# Patient Record
Sex: FEMALE | Race: WHITE | NOT HISPANIC OR LATINO
[De-identification: names, ages, dates, MRNs, and addresses within clinical notes are randomized per-mention and may not be internally consistent; named-entity substitution may affect disease eponyms.]

---

## 2017-07-05 ENCOUNTER — APPOINTMENT (OUTPATIENT)
Dept: CARDIOLOGY | Facility: CLINIC | Age: 52
End: 2017-07-05

## 2017-07-10 ENCOUNTER — LABORATORY RESULT (OUTPATIENT)
Age: 52
End: 2017-07-10

## 2017-07-10 ENCOUNTER — APPOINTMENT (OUTPATIENT)
Dept: GYNECOLOGIC ONCOLOGY | Facility: CLINIC | Age: 52
End: 2017-07-10
Payer: COMMERCIAL

## 2017-07-10 DIAGNOSIS — M54.5 LOW BACK PAIN: ICD-10-CM

## 2017-07-10 PROCEDURE — 99214 OFFICE O/P EST MOD 30 MIN: CPT

## 2017-07-10 PROCEDURE — 36415 COLL VENOUS BLD VENIPUNCTURE: CPT

## 2017-07-13 LAB
25(OH)D3 SERPL-MCNC: 23.2 NG/ML
BASOPHILS # BLD AUTO: 0.01 K/UL
BASOPHILS NFR BLD AUTO: 0.2 %
CARDIOLIPIN AB SER IA-ACNC: NEGATIVE
CRP SERPL-MCNC: 0.6 MG/DL
EOSINOPHIL # BLD AUTO: 0.04 K/UL
EOSINOPHIL NFR BLD AUTO: 0.7 %
ERYTHROCYTE [SEDIMENTATION RATE] IN BLOOD BY WESTERGREN METHOD: 28 MM/HR
HCT VFR BLD CALC: 40.5 %
HGB BLD-MCNC: 13.4 G/DL
IMM GRANULOCYTES NFR BLD AUTO: 0.2 %
LYMPHOCYTES # BLD AUTO: 1.98 K/UL
LYMPHOCYTES NFR BLD AUTO: 36.1 %
MAN DIFF?: NORMAL
MCHC RBC-ENTMCNC: 30.2 PG
MCHC RBC-ENTMCNC: 33.1 GM/DL
MCV RBC AUTO: 91.2 FL
MONOCYTES # BLD AUTO: 0.3 K/UL
MONOCYTES NFR BLD AUTO: 5.5 %
NEUTROPHILS # BLD AUTO: 3.15 K/UL
NEUTROPHILS NFR BLD AUTO: 57.3 %
PLATELET # BLD AUTO: 322 K/UL
RBC # BLD: 4.44 M/UL
RBC # FLD: 15.5 %
TSH SERPL-ACNC: 1.81 UIU/ML
WBC # FLD AUTO: 5.49 K/UL

## 2017-07-18 ENCOUNTER — APPOINTMENT (OUTPATIENT)
Dept: CARDIOLOGY | Facility: CLINIC | Age: 52
End: 2017-07-18

## 2017-07-19 ENCOUNTER — APPOINTMENT (OUTPATIENT)
Dept: CARDIOLOGY | Facility: CLINIC | Age: 52
End: 2017-07-19

## 2017-07-19 ENCOUNTER — CLINICAL ADVICE (OUTPATIENT)
Age: 52
End: 2017-07-19

## 2017-07-31 ENCOUNTER — TRANSCRIPTION ENCOUNTER (OUTPATIENT)
Age: 52
End: 2017-07-31

## 2017-08-09 ENCOUNTER — APPOINTMENT (OUTPATIENT)
Dept: CARDIOLOGY | Facility: CLINIC | Age: 52
End: 2017-08-09
Payer: COMMERCIAL

## 2017-08-09 PROCEDURE — 99213 OFFICE O/P EST LOW 20 MIN: CPT

## 2018-12-03 ENCOUNTER — APPOINTMENT (OUTPATIENT)
Dept: GYNECOLOGIC ONCOLOGY | Facility: CLINIC | Age: 53
End: 2018-12-03

## 2019-03-25 NOTE — PAST MEDICAL HISTORY
[Surgical Menopause] : The patient is in surgical menopause [Menarche Age ____] : age at menarche was [unfilled] [Definite ___ (Date)] : the last menstrual period was [unfilled]

## 2019-04-01 ENCOUNTER — TRANSCRIPTION ENCOUNTER (OUTPATIENT)
Age: 54
End: 2019-04-01

## 2019-04-01 ENCOUNTER — APPOINTMENT (OUTPATIENT)
Dept: GYNECOLOGIC ONCOLOGY | Facility: CLINIC | Age: 54
End: 2019-04-01
Payer: COMMERCIAL

## 2019-04-01 VITALS
SYSTOLIC BLOOD PRESSURE: 179 MMHG | BODY MASS INDEX: 35.74 KG/M2 | HEIGHT: 65 IN | WEIGHT: 214.5 LBS | DIASTOLIC BLOOD PRESSURE: 97 MMHG | HEART RATE: 62 BPM | OXYGEN SATURATION: 95 %

## 2019-04-01 PROCEDURE — 36415 COLL VENOUS BLD VENIPUNCTURE: CPT

## 2019-04-01 PROCEDURE — 99214 OFFICE O/P EST MOD 30 MIN: CPT

## 2019-04-01 NOTE — HISTORY OF PRESENT ILLNESS
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 Negative peritoneal biopsies\par    b) LVSI + in endometrial tumor\par 3) Placement of IP port 10/07\par 4) Ip Taxol 135mg/m2 + Cisplatin IP 75mg/m2 + IV Taxol 60mg/m2 x 6 initiated on 10/29/07\par 5) Vaginal HDR completed April 2008 (2,000 cGy)\par 6) Removal of IP port 3/6/08\par      a)negative washings\par 7) Incisional hernia repair, 4/09\par     a)negative hernia sac for malignancy\par     b) incarcerated bowel re-operation\par 8) Multiple abdominal hernia repairs, 4/12\par \par Here for annual follow up. Doing well.  No complaints. Has not been here in 2 years\par \par Health Maintenance\par mammogram 2018 was normal as per patient.\par Bone Density 6/2017 Osteopenia

## 2019-04-01 NOTE — DISCUSSION/SUMMARY
[FreeTextEntry1] : Clinically [facility]\par \par -Blood work drawn today\par -Mammogram Rx given\par -CT scan at  patient request\par -colonoscopy needed for screening\par -repeat genetic testing because she had it over 10 years ago and now has done [email or web address] \par -will meet with PMD as her BP has been very high for a while and she has not seen one\par -I recommend she takes her bp daily at her home\par -exercise and weight loss has been advised\par -annual followup

## 2019-04-02 LAB
ALBUMIN SERPL ELPH-MCNC: 4.7 G/DL
ALP BLD-CCNC: 95 U/L
ALT SERPL-CCNC: 16 U/L
ANION GAP SERPL CALC-SCNC: 12 MMOL/L
AST SERPL-CCNC: 21 U/L
BASOPHILS # BLD AUTO: 0.02 K/UL
BASOPHILS NFR BLD AUTO: 0.3 %
BILIRUB SERPL-MCNC: 0.5 MG/DL
BUN SERPL-MCNC: 16 MG/DL
CALCIUM SERPL-MCNC: 9.9 MG/DL
CANCER AG125 SERPL-ACNC: 18 U/ML
CHLORIDE SERPL-SCNC: 106 MMOL/L
CHOLEST SERPL-MCNC: 183 MG/DL
CHOLEST/HDLC SERPL: 2.8 RATIO
CO2 SERPL-SCNC: 24 MMOL/L
CREAT SERPL-MCNC: 0.88 MG/DL
EOSINOPHIL # BLD AUTO: 0.06 K/UL
EOSINOPHIL NFR BLD AUTO: 1 %
ESTIMATED AVERAGE GLUCOSE: 108 MG/DL
HBA1C MFR BLD HPLC: 5.4 %
HCT VFR BLD CALC: 44 %
HDLC SERPL-MCNC: 66 MG/DL
HGB BLD-MCNC: 14.5 G/DL
IMM GRANULOCYTES NFR BLD AUTO: 0.2 %
LDLC SERPL CALC-MCNC: 106 MG/DL
LYMPHOCYTES # BLD AUTO: 2.05 K/UL
LYMPHOCYTES NFR BLD AUTO: 33.2 %
MAN DIFF?: NORMAL
MCHC RBC-ENTMCNC: 30.7 PG
MCHC RBC-ENTMCNC: 33 GM/DL
MCV RBC AUTO: 93.2 FL
MONOCYTES # BLD AUTO: 0.47 K/UL
MONOCYTES NFR BLD AUTO: 7.6 %
NEUTROPHILS # BLD AUTO: 3.56 K/UL
NEUTROPHILS NFR BLD AUTO: 57.7 %
PLATELET # BLD AUTO: 317 K/UL
POTASSIUM SERPL-SCNC: 3.9 MMOL/L
PROT SERPL-MCNC: 7.2 G/DL
RBC # BLD: 4.72 M/UL
RBC # FLD: 12.9 %
SODIUM SERPL-SCNC: 142 MMOL/L
TRIGL SERPL-MCNC: 54 MG/DL
WBC # FLD AUTO: 6.17 K/UL

## 2019-11-07 ENCOUNTER — RX RENEWAL (OUTPATIENT)
Age: 54
End: 2019-11-07

## 2020-03-13 ENCOUNTER — RX RENEWAL (OUTPATIENT)
Age: 55
End: 2020-03-13

## 2021-04-08 ENCOUNTER — NON-APPOINTMENT (OUTPATIENT)
Age: 56
End: 2021-04-08

## 2021-04-15 ENCOUNTER — APPOINTMENT (OUTPATIENT)
Dept: CARDIOLOGY | Facility: CLINIC | Age: 56
End: 2021-04-15
Payer: COMMERCIAL

## 2021-04-15 ENCOUNTER — NON-APPOINTMENT (OUTPATIENT)
Age: 56
End: 2021-04-15

## 2021-04-15 VITALS
HEART RATE: 78 BPM | WEIGHT: 226 LBS | BODY MASS INDEX: 37.65 KG/M2 | DIASTOLIC BLOOD PRESSURE: 80 MMHG | SYSTOLIC BLOOD PRESSURE: 122 MMHG | TEMPERATURE: 97.3 F | HEIGHT: 65 IN | OXYGEN SATURATION: 98 %

## 2021-04-15 PROCEDURE — 99215 OFFICE O/P EST HI 40 MIN: CPT

## 2021-04-15 PROCEDURE — 99072 ADDL SUPL MATRL&STAF TM PHE: CPT

## 2021-04-15 PROCEDURE — 93000 ELECTROCARDIOGRAM COMPLETE: CPT

## 2021-04-15 NOTE — DISCUSSION/SUMMARY
[FreeTextEntry1] : She will repeat the echo and then see me again afterwards and I will try and refine her blood pressure medication.

## 2021-04-15 NOTE — REASON FOR VISIT
[Follow-Up - From Hospitalization] : follow-up of a recent hospitalization for [Hypertension] : hypertension [FreeTextEntry1] : I saw this 55-year-old woman in follow-up consultation on  04/15/21\par She is 12 years free of ovarian cancer after chemotherapy and radiation she was seen by me 4 years ago and now comes to see me because she was recently in the hospital with Covid and finds that she now has hypertension requiring medication.\par She is otherwise asymptomatic\par She saw me 4 years ago because a CT scan showed aortic atherosclerosis.  She had carotid Dopplers which were benign and a cardiac echo which was benign\par On her current regime of hydralazine 25 3 times daily and amlodipine 10 mg daily should her blood pressure is well controlled.

## 2021-04-15 NOTE — PHYSICAL EXAM
[General Appearance - Well Developed] : well developed [Normal Appearance] : normal appearance [Well Groomed] : well groomed [General Appearance - Well Nourished] : well nourished [No Deformities] : no deformities [General Appearance - In No Acute Distress] : no acute distress [Normal Conjunctiva] : the conjunctiva exhibited no abnormalities [Eyelids - No Xanthelasma] : the eyelids demonstrated no xanthelasmas [Normal Oral Mucosa] : normal oral mucosa [No Oral Pallor] : no oral pallor [No Oral Cyanosis] : no oral cyanosis [Normal Jugular Venous A Waves Present] : normal jugular venous A waves present [Normal Jugular Venous V Waves Present] : normal jugular venous V waves present [No Jugular Venous Gonzalez A Waves] : no jugular venous gonzalez A waves [Respiration, Rhythm And Depth] : normal respiratory rhythm and effort [Exaggerated Use Of Accessory Muscles For Inspiration] : no accessory muscle use [Auscultation Breath Sounds / Voice Sounds] : lungs were clear to auscultation bilaterally [Heart Rate And Rhythm] : heart rate and rhythm were normal [Heart Sounds] : normal S1 and S2 [Murmurs] : no murmurs present [Abdomen Soft] : soft [Abdomen Tenderness] : non-tender [Abdomen Mass (___ Cm)] : no abdominal mass palpated [Abnormal Walk] : normal gait [Gait - Sufficient For Exercise Testing] : the gait was sufficient for exercise testing [Nail Clubbing] : no clubbing of the fingernails [Cyanosis, Localized] : no localized cyanosis [Petechial Hemorrhages (___cm)] : no petechial hemorrhages [Skin Color & Pigmentation] : normal skin color and pigmentation [] : no rash [No Venous Stasis] : no venous stasis [Skin Lesions] : no skin lesions [No Skin Ulcers] : no skin ulcer [No Xanthoma] : no  xanthoma was observed [Oriented To Time, Place, And Person] : oriented to person, place, and time [Affect] : the affect was normal [No Anxiety] : not feeling anxious [Mood] : the mood was normal

## 2021-05-01 ENCOUNTER — NON-APPOINTMENT (OUTPATIENT)
Age: 56
End: 2021-05-01

## 2021-05-03 ENCOUNTER — APPOINTMENT (OUTPATIENT)
Dept: CARDIOLOGY | Facility: CLINIC | Age: 56
End: 2021-05-03
Payer: COMMERCIAL

## 2021-05-03 PROCEDURE — 93306 TTE W/DOPPLER COMPLETE: CPT

## 2021-05-03 PROCEDURE — 99072 ADDL SUPL MATRL&STAF TM PHE: CPT

## 2021-05-20 ENCOUNTER — APPOINTMENT (OUTPATIENT)
Dept: CARDIOLOGY | Facility: CLINIC | Age: 56
End: 2021-05-20
Payer: COMMERCIAL

## 2021-05-20 VITALS
WEIGHT: 222 LBS | HEART RATE: 76 BPM | OXYGEN SATURATION: 98 % | HEIGHT: 65 IN | BODY MASS INDEX: 36.99 KG/M2 | SYSTOLIC BLOOD PRESSURE: 124 MMHG | DIASTOLIC BLOOD PRESSURE: 82 MMHG | TEMPERATURE: 97.7 F

## 2021-05-20 PROCEDURE — 99215 OFFICE O/P EST HI 40 MIN: CPT

## 2021-05-20 RX ORDER — AMLODIPINE BESYLATE 5 MG/1
5 TABLET ORAL DAILY
Qty: 90 | Refills: 1 | Status: DISCONTINUED | COMMUNITY
Start: 1900-01-01 | End: 2021-05-20

## 2021-05-20 NOTE — REASON FOR VISIT
[Follow-Up - From Hospitalization] : follow-up of a recent hospitalization for [Hypertension] : hypertension [FreeTextEntry1] : I saw this 55-year-old woman in follow-up consultation on  04/15/21\par She is 12 years free of ovarian cancer after chemotherapy and radiation she was seen by me 4 years ago and now comes to see me because she was recently in the hospital with Covid and finds that she now has hypertension requiring medication.\par She is otherwise asymptomatic\par She saw me 4 years ago because a CT scan showed aortic atherosclerosis.  She had carotid Dopplers which were benign and a cardiac echo which was benign\par On her current regime of hydralazine 25 3 times daily and amlodipine 10 mg daily should her blood pressure is well controlled.\par However because of significant leg swelling the amlodipine was reduced to 5 mg.  But she still has some leg swelling and I will discontinue the amlodipine and start losartan 25 mg daily.

## 2021-05-20 NOTE — PHYSICAL EXAM
[General Appearance - Well Developed] : well developed [Normal Appearance] : normal appearance [Well Groomed] : well groomed [General Appearance - Well Nourished] : well nourished [No Deformities] : no deformities [General Appearance - In No Acute Distress] : no acute distress [Normal Conjunctiva] : the conjunctiva exhibited no abnormalities [Eyelids - No Xanthelasma] : the eyelids demonstrated no xanthelasmas [Normal Oral Mucosa] : normal oral mucosa [No Oral Pallor] : no oral pallor [No Oral Cyanosis] : no oral cyanosis [Normal Jugular Venous A Waves Present] : normal jugular venous A waves present [Normal Jugular Venous V Waves Present] : normal jugular venous V waves present [No Jugular Venous Gonzalez A Waves] : no jugular venous gonzalez A waves [Respiration, Rhythm And Depth] : normal respiratory rhythm and effort [Exaggerated Use Of Accessory Muscles For Inspiration] : no accessory muscle use [Auscultation Breath Sounds / Voice Sounds] : lungs were clear to auscultation bilaterally [Heart Rate And Rhythm] : heart rate and rhythm were normal [Heart Sounds] : normal S1 and S2 [Murmurs] : no murmurs present [Abdomen Soft] : soft [Abdomen Tenderness] : non-tender [Abdomen Mass (___ Cm)] : no abdominal mass palpated [Abnormal Walk] : normal gait [Gait - Sufficient For Exercise Testing] : the gait was sufficient for exercise testing [Nail Clubbing] : no clubbing of the fingernails [Cyanosis, Localized] : no localized cyanosis [Petechial Hemorrhages (___cm)] : no petechial hemorrhages [Skin Color & Pigmentation] : normal skin color and pigmentation [] : no rash [No Venous Stasis] : no venous stasis [Skin Lesions] : no skin lesions [No Skin Ulcers] : no skin ulcer [No Xanthoma] : no  xanthoma was observed [Oriented To Time, Place, And Person] : oriented to person, place, and time [Affect] : the affect was normal [Mood] : the mood was normal [No Anxiety] : not feeling anxious

## 2021-05-20 NOTE — DISCUSSION/SUMMARY
[FreeTextEntry1] : The repeat  echo was normal.\par Blood pressure meds rearranged. D/c Norvasc and started Losartan 25mg\par Return in 6 months

## 2021-07-02 RX ORDER — LOSARTAN POTASSIUM 25 MG/1
25 TABLET, FILM COATED ORAL DAILY
Qty: 90 | Refills: 3 | Status: DISCONTINUED | COMMUNITY
Start: 2021-05-20 | End: 2021-07-02

## 2021-08-09 ENCOUNTER — APPOINTMENT (OUTPATIENT)
Dept: GYNECOLOGIC ONCOLOGY | Facility: CLINIC | Age: 56
End: 2021-08-09

## 2021-08-23 ENCOUNTER — APPOINTMENT (OUTPATIENT)
Dept: GYNECOLOGIC ONCOLOGY | Facility: CLINIC | Age: 56
End: 2021-08-23

## 2021-09-20 ENCOUNTER — APPOINTMENT (OUTPATIENT)
Dept: GYNECOLOGIC ONCOLOGY | Facility: CLINIC | Age: 56
End: 2021-09-20

## 2022-01-28 ENCOUNTER — NON-APPOINTMENT (OUTPATIENT)
Age: 57
End: 2022-01-28

## 2022-02-24 ENCOUNTER — APPOINTMENT (OUTPATIENT)
Dept: CARDIOLOGY | Facility: CLINIC | Age: 57
End: 2022-02-24

## 2022-03-28 ENCOUNTER — APPOINTMENT (OUTPATIENT)
Dept: GYNECOLOGIC ONCOLOGY | Facility: CLINIC | Age: 57
End: 2022-03-28
Payer: COMMERCIAL

## 2022-03-28 VITALS
SYSTOLIC BLOOD PRESSURE: 127 MMHG | RESPIRATION RATE: 18 BRPM | WEIGHT: 227 LBS | OXYGEN SATURATION: 98 % | BODY MASS INDEX: 37.82 KG/M2 | DIASTOLIC BLOOD PRESSURE: 83 MMHG | HEART RATE: 73 BPM | TEMPERATURE: 97.6 F | HEIGHT: 65 IN

## 2022-03-28 PROCEDURE — ZZZZZ: CPT

## 2022-03-29 ENCOUNTER — TRANSCRIPTION ENCOUNTER (OUTPATIENT)
Age: 57
End: 2022-03-29

## 2022-03-29 LAB
CANCER AG125 SERPL-ACNC: 20 U/ML
CHOLEST SERPL-MCNC: 230 MG/DL
ESTIMATED AVERAGE GLUCOSE: 114 MG/DL
HBA1C MFR BLD HPLC: 5.6 %
HDLC SERPL-MCNC: 67 MG/DL
LDLC SERPL CALC-MCNC: 143 MG/DL
NONHDLC SERPL-MCNC: 163 MG/DL
TRIGL SERPL-MCNC: 100 MG/DL
TSH SERPL-ACNC: 1.23 UIU/ML

## 2022-04-05 ENCOUNTER — NON-APPOINTMENT (OUTPATIENT)
Age: 57
End: 2022-04-05

## 2022-04-07 ENCOUNTER — NON-APPOINTMENT (OUTPATIENT)
Age: 57
End: 2022-04-07

## 2022-04-07 ENCOUNTER — APPOINTMENT (OUTPATIENT)
Dept: CARDIOLOGY | Facility: CLINIC | Age: 57
End: 2022-04-07
Payer: COMMERCIAL

## 2022-04-07 VITALS
SYSTOLIC BLOOD PRESSURE: 124 MMHG | WEIGHT: 224 LBS | TEMPERATURE: 97.5 F | DIASTOLIC BLOOD PRESSURE: 82 MMHG | OXYGEN SATURATION: 96 % | BODY MASS INDEX: 37.32 KG/M2 | HEIGHT: 65 IN | HEART RATE: 70 BPM

## 2022-04-07 DIAGNOSIS — U09.9 POST COVID-19 CONDITION, UNSPECIFIED: ICD-10-CM

## 2022-04-07 DIAGNOSIS — C54.1 MALIGNANT NEOPLASM OF ENDOMETRIUM: ICD-10-CM

## 2022-04-07 PROCEDURE — 99215 OFFICE O/P EST HI 40 MIN: CPT

## 2022-04-07 PROCEDURE — 93000 ELECTROCARDIOGRAM COMPLETE: CPT

## 2022-04-07 NOTE — DISCUSSION/SUMMARY
[FreeTextEntry1] : The repeat  echo was normal.\par Blood pressure meds rearranged. BP well controlled\par Return in 6 months\par There is no issues with her taking Ozempic

## 2022-04-07 NOTE — REASON FOR VISIT
[Hyperlipidemia] : hyperlipidemia [Follow-Up - From Hospitalization] : follow-up of a recent hospitalization for [Hypertension] : hypertension [FreeTextEntry3] : Kayla [FreeTextEntry1] : I saw this 56-year-old woman in follow-up consultation on  04/07/22\par She is 12 years free of ovarian cancer after chemotherapy and radiation she was seen by me 5 years ago and now comes to see me because she was recently in the hospital with Covid and finds that she now has hypertension requiring medication.\par She is otherwise asymptomatic\par She saw me 4 years ago because a CT scan showed aortic atherosclerosis.  She had carotid Dopplers which were benign and a cardiac echo which was benign\par On her current regime of hydralazine 25 3 times daily and Lisinopril 10 mg daily should her blood pressure is well controlled.\par

## 2022-05-05 ENCOUNTER — APPOINTMENT (OUTPATIENT)
Dept: UROGYNECOLOGY | Facility: CLINIC | Age: 57
End: 2022-05-05
Payer: COMMERCIAL

## 2022-05-05 ENCOUNTER — RESULT CHARGE (OUTPATIENT)
Age: 57
End: 2022-05-05

## 2022-05-05 DIAGNOSIS — Z82.3 FAMILY HISTORY OF STROKE: ICD-10-CM

## 2022-05-05 DIAGNOSIS — H40.20X0 UNSPECIFIED PRIMARY ANGLE-CLOSURE GLAUCOMA, STAGE UNSPECIFIED: ICD-10-CM

## 2022-05-05 DIAGNOSIS — N39.41 URGE INCONTINENCE: ICD-10-CM

## 2022-05-05 DIAGNOSIS — K59.00 CONSTIPATION, UNSPECIFIED: ICD-10-CM

## 2022-05-05 DIAGNOSIS — Z84.1 FAMILY HISTORY OF DISORDERS OF KIDNEY AND URETER: ICD-10-CM

## 2022-05-05 DIAGNOSIS — R35.1 NOCTURIA: ICD-10-CM

## 2022-05-05 DIAGNOSIS — K46.9 UNSPECIFIED ABDOMINAL HERNIA W/OUT OBSTRUCTION OR GANGRENE: ICD-10-CM

## 2022-05-05 DIAGNOSIS — E66.9 OBESITY, UNSPECIFIED: ICD-10-CM

## 2022-05-05 DIAGNOSIS — R33.9 RETENTION OF URINE, UNSPECIFIED: ICD-10-CM

## 2022-05-05 LAB
BILIRUB UR QL STRIP: NEGATIVE
CLARITY UR: CLEAR
COLLECTION METHOD: NORMAL
GLUCOSE UR-MCNC: NEGATIVE
HCG UR QL: 0.2 EU/DL
HGB UR QL STRIP.AUTO: NEGATIVE
KETONES UR-MCNC: NORMAL
LEUKOCYTE ESTERASE UR QL STRIP: NORMAL
NITRITE UR QL STRIP: NEGATIVE
PH UR STRIP: 7
PROT UR STRIP-MCNC: NEGATIVE
SP GR UR STRIP: 1.02

## 2022-05-05 PROCEDURE — 51701 INSERT BLADDER CATHETER: CPT

## 2022-05-05 PROCEDURE — 99204 OFFICE O/P NEW MOD 45 MIN: CPT | Mod: 25

## 2022-05-05 RX ORDER — SEMAGLUTIDE 1.34 MG/ML
INJECTION, SOLUTION SUBCUTANEOUS
Refills: 0 | Status: ACTIVE | COMMUNITY

## 2022-05-05 NOTE — OB HISTORY
[Vaginal ___] : [unfilled] vaginal delivery(s) [Approximately ___ (Month)] : the LMP was approximately [unfilled] month(s) ago [Last Pap Smear ___] : date of last pap smear was on [unfilled] [Sexually Active] : sexually active [Abnormal Pap Smear] : normal pap smear [Taking Estrogens] : is not taking estrogen replacement [FreeTextEntry1] : Largest baby 8#2oz.

## 2022-05-05 NOTE — DISCUSSION/SUMMARY
[FreeTextEntry1] : Ms. CARPIO is 56 with mixed UI, moderate mid line cystocele, cancer survivor. We discussed her prolapse and the symptoms associated with it. At this point it is largely asymptomatic. If she does desire a repair I would think some vaginal suturing would be adequate.  We talked about the types of urinary incontinence and the treatment options. We reviewed physical therapy, medication, surgery, vaginal inserts and third line treatments. I recommended bladder testing UDTs. I sent her urine to the lab. I gave her some literature on pelvic muscle strengthening and bladder training. She would like to avoid surgery if possibly. I was able to answer all of her questions.\par

## 2022-05-05 NOTE — REASON FOR VISIT
[Pelvic Organ Prolapse] : pelvic organ prolapse [Urinary Urgency] : urinary urgency [Urinary Incontinence] : urinary incontinence

## 2022-05-05 NOTE — HISTORY OF PRESENT ILLNESS
[FreeTextEntry1] : 55 yo  female with complaints of questionable of prolapse and leaking of urine. She has significant urgency and will leak with an urge. She will also leak with sneeze or cough. Not wearing pads. Has urinary frequency but admits to drinking several cups of coffee. Feels she empties but will void and then shorty after will have to void again. Gets up 2-3 times at night. Not wetting bed.  Admits to drinking a lot of water. Told by GYN she had a dropped bladder. She does feels something right at the opening. Mother had a pessary. Has chronic constipation. Had endometrial and ovarian cancer. Had hysterectomy 2007. Had both radiation and chemo. Has narrow angle glaucoma, had surgery and her pressure is normal. Has 2 hernias that were repaired with mesh. Had SBO after cancer surgery.

## 2022-05-09 LAB
APPEARANCE: CLEAR
BACTERIA UR CULT: NORMAL
BACTERIA: NEGATIVE
BILIRUBIN URINE: NEGATIVE
BLOOD URINE: NORMAL
COLOR: YELLOW
GLUCOSE QUALITATIVE U: NEGATIVE
KETONES URINE: NEGATIVE
LEUKOCYTE ESTERASE URINE: NEGATIVE
MICROSCOPIC-UA: NORMAL
NITRITE URINE: NEGATIVE
PH URINE: 6
PROTEIN URINE: NEGATIVE
RED BLOOD CELLS URINE: 1 /HPF
SPECIFIC GRAVITY URINE: 1.02
SQUAMOUS EPITHELIAL CELLS: 0 /HPF
UROBILINOGEN URINE: NORMAL
WHITE BLOOD CELLS URINE: 1 /HPF

## 2022-06-17 ENCOUNTER — APPOINTMENT (OUTPATIENT)
Dept: UROGYNECOLOGY | Facility: CLINIC | Age: 57
End: 2022-06-17

## 2022-06-17 NOTE — HISTORY OF PRESENT ILLNESS
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 Negative peritoneal biopsies\par    b) LVSI + in endometrial tumor\par 3) Placement of IP port 10/07\par 4) Ip Taxol 135mg/m2 + Cisplatin IP 75mg/m2 + IV Taxol 60mg/m2 x 6 initiated on 10/29/07\par 5) Vaginal HDR completed April 2008 (2,000 cGy)\par 6) Removal of IP port 3/6/08\par      a)negative washings\par 7) Incisional hernia repair, 4/09\par     a)negative hernia sac for malignancy\par     b) incarcerated bowel re-operation\par 8) Multiple abdominal hernia repairs, 4/12\par 9) CTAP 4/2019 [facility]\par \par Here for annual follow up. Overdue for surveillance. Stuggling with weight loss.  Back painful at times, no vaginal bleeding.\par \par Health Maintenance\par mammogram 2018 was normal as per patient.\par Bone Density 6/2017 Osteopenia

## 2022-06-17 NOTE — DISCUSSION/SUMMARY
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

## 2022-10-24 ENCOUNTER — RX RENEWAL (OUTPATIENT)
Age: 57
End: 2022-10-24

## 2023-01-16 ENCOUNTER — APPOINTMENT (OUTPATIENT)
Dept: OPHTHALMOLOGY | Facility: CLINIC | Age: 58
End: 2023-01-16
Payer: COMMERCIAL

## 2023-01-16 ENCOUNTER — NON-APPOINTMENT (OUTPATIENT)
Age: 58
End: 2023-01-16

## 2023-01-16 PROCEDURE — 99203 OFFICE O/P NEW LOW 30 MIN: CPT

## 2023-01-19 ENCOUNTER — APPOINTMENT (OUTPATIENT)
Dept: OPHTHALMOLOGY | Facility: CLINIC | Age: 58
End: 2023-01-19
Payer: COMMERCIAL

## 2023-01-19 ENCOUNTER — NON-APPOINTMENT (OUTPATIENT)
Age: 58
End: 2023-01-19

## 2023-01-19 PROCEDURE — 99213 OFFICE O/P EST LOW 20 MIN: CPT

## 2023-01-26 ENCOUNTER — NON-APPOINTMENT (OUTPATIENT)
Age: 58
End: 2023-01-26

## 2023-01-26 ENCOUNTER — APPOINTMENT (OUTPATIENT)
Dept: OPHTHALMOLOGY | Facility: CLINIC | Age: 58
End: 2023-01-26
Payer: COMMERCIAL

## 2023-01-26 PROCEDURE — 99213 OFFICE O/P EST LOW 20 MIN: CPT

## 2023-02-03 ENCOUNTER — APPOINTMENT (OUTPATIENT)
Dept: OPHTHALMOLOGY | Facility: CLINIC | Age: 58
End: 2023-02-03
Payer: COMMERCIAL

## 2023-02-03 ENCOUNTER — NON-APPOINTMENT (OUTPATIENT)
Age: 58
End: 2023-02-03

## 2023-02-03 PROCEDURE — 92014 COMPRE OPH EXAM EST PT 1/>: CPT

## 2023-03-24 ENCOUNTER — RX RENEWAL (OUTPATIENT)
Age: 58
End: 2023-03-24

## 2023-08-07 ENCOUNTER — RX RENEWAL (OUTPATIENT)
Age: 58
End: 2023-08-07

## 2023-08-08 ENCOUNTER — APPOINTMENT (OUTPATIENT)
Dept: CARDIOLOGY | Facility: CLINIC | Age: 58
End: 2023-08-08

## 2023-08-31 ENCOUNTER — RX RENEWAL (OUTPATIENT)
Age: 58
End: 2023-08-31

## 2023-09-11 ENCOUNTER — APPOINTMENT (OUTPATIENT)
Dept: GYNECOLOGIC ONCOLOGY | Facility: CLINIC | Age: 58
End: 2023-09-11

## 2023-09-27 RX ORDER — TRAMADOL HYDROCHLORIDE 50 MG/1
50 TABLET, COATED ORAL
Qty: 30 | Refills: 1 | Status: ACTIVE | COMMUNITY
Start: 2021-12-29 | End: 1900-01-01

## 2023-11-13 ENCOUNTER — APPOINTMENT (OUTPATIENT)
Dept: GYNECOLOGIC ONCOLOGY | Facility: CLINIC | Age: 58
End: 2023-11-13
Payer: COMMERCIAL

## 2023-11-13 ENCOUNTER — NON-APPOINTMENT (OUTPATIENT)
Age: 58
End: 2023-11-13

## 2023-11-13 VITALS
WEIGHT: 160 LBS | HEART RATE: 65 BPM | SYSTOLIC BLOOD PRESSURE: 129 MMHG | HEIGHT: 65 IN | TEMPERATURE: 97.2 F | DIASTOLIC BLOOD PRESSURE: 83 MMHG | BODY MASS INDEX: 26.66 KG/M2 | OXYGEN SATURATION: 100 %

## 2023-11-13 DIAGNOSIS — C56.9 MALIGNANT NEOPLASM OF UNSPECIFIED OVARY: ICD-10-CM

## 2023-11-13 DIAGNOSIS — N60.11 DIFFUSE CYSTIC MASTOPATHY OF LEFT BREAST: ICD-10-CM

## 2023-11-13 DIAGNOSIS — N60.12 DIFFUSE CYSTIC MASTOPATHY OF LEFT BREAST: ICD-10-CM

## 2023-11-13 DIAGNOSIS — Z13.29 ENCOUNTER FOR SCREENING FOR OTHER SUSPECTED ENDOCRINE DISORDER: ICD-10-CM

## 2023-11-13 PROCEDURE — 99213 OFFICE O/P EST LOW 20 MIN: CPT

## 2023-11-14 LAB
ALBUMIN SERPL ELPH-MCNC: 4.6 G/DL
ALP BLD-CCNC: 83 U/L
ALT SERPL-CCNC: 19 U/L
ANION GAP SERPL CALC-SCNC: 15 MMOL/L
AST SERPL-CCNC: 19 U/L
BASOPHILS # BLD AUTO: 0.02 K/UL
BASOPHILS NFR BLD AUTO: 0.3 %
BILIRUB SERPL-MCNC: 0.4 MG/DL
BUN SERPL-MCNC: 16 MG/DL
CALCIUM SERPL-MCNC: 9.5 MG/DL
CANCER AG125 SERPL-ACNC: 17 U/ML
CHLORIDE SERPL-SCNC: 102 MMOL/L
CHOLEST SERPL-MCNC: 172 MG/DL
CO2 SERPL-SCNC: 22 MMOL/L
CREAT SERPL-MCNC: 1.01 MG/DL
EGFR: 64 ML/MIN/1.73M2
EOSINOPHIL # BLD AUTO: 0.05 K/UL
EOSINOPHIL NFR BLD AUTO: 0.8 %
HCT VFR BLD CALC: 41.8 %
HDLC SERPL-MCNC: 73 MG/DL
HGB BLD-MCNC: 13.7 G/DL
IMM GRANULOCYTES NFR BLD AUTO: 0.2 %
LDLC SERPL CALC-MCNC: 89 MG/DL
LYMPHOCYTES # BLD AUTO: 2.24 K/UL
LYMPHOCYTES NFR BLD AUTO: 35.5 %
MAGNESIUM SERPL-MCNC: 1.8 MG/DL
MAN DIFF?: NORMAL
MCHC RBC-ENTMCNC: 31.4 PG
MCHC RBC-ENTMCNC: 32.8 GM/DL
MCV RBC AUTO: 95.9 FL
MONOCYTES # BLD AUTO: 0.45 K/UL
MONOCYTES NFR BLD AUTO: 7.1 %
NEUTROPHILS # BLD AUTO: 3.54 K/UL
NEUTROPHILS NFR BLD AUTO: 56.1 %
NONHDLC SERPL-MCNC: 99 MG/DL
PLATELET # BLD AUTO: 291 K/UL
POTASSIUM SERPL-SCNC: 4 MMOL/L
PROT SERPL-MCNC: 6.5 G/DL
RBC # BLD: 4.36 M/UL
RBC # FLD: 12.1 %
SODIUM SERPL-SCNC: 140 MMOL/L
TRIGL SERPL-MCNC: 48 MG/DL
TSH SERPL-ACNC: 0.79 UIU/ML
WBC # FLD AUTO: 6.31 K/UL

## 2024-01-22 ENCOUNTER — RX RENEWAL (OUTPATIENT)
Age: 59
End: 2024-01-22

## 2024-01-22 RX ORDER — IBUPROFEN 800 MG/1
800 TABLET, FILM COATED ORAL
Qty: 90 | Refills: 3 | Status: ACTIVE | COMMUNITY
Start: 2019-09-13 | End: 1900-01-01

## 2024-02-02 ENCOUNTER — APPOINTMENT (OUTPATIENT)
Dept: OPHTHALMOLOGY | Facility: CLINIC | Age: 59
End: 2024-02-02

## 2024-04-30 ENCOUNTER — NON-APPOINTMENT (OUTPATIENT)
Age: 59
End: 2024-04-30

## 2024-04-30 ENCOUNTER — APPOINTMENT (OUTPATIENT)
Dept: CARDIOLOGY | Facility: CLINIC | Age: 59
End: 2024-04-30
Payer: COMMERCIAL

## 2024-04-30 VITALS
OXYGEN SATURATION: 99 % | WEIGHT: 156 LBS | HEART RATE: 81 BPM | HEIGHT: 65 IN | BODY MASS INDEX: 25.99 KG/M2 | SYSTOLIC BLOOD PRESSURE: 110 MMHG | DIASTOLIC BLOOD PRESSURE: 60 MMHG

## 2024-04-30 DIAGNOSIS — I10 ESSENTIAL (PRIMARY) HYPERTENSION: ICD-10-CM

## 2024-04-30 DIAGNOSIS — E78.5 HYPERLIPIDEMIA, UNSPECIFIED: ICD-10-CM

## 2024-04-30 DIAGNOSIS — Z00.00 ENCOUNTER FOR GENERAL ADULT MEDICAL EXAMINATION W/OUT ABNORMAL FINDINGS: ICD-10-CM

## 2024-04-30 PROCEDURE — 93000 ELECTROCARDIOGRAM COMPLETE: CPT

## 2024-04-30 PROCEDURE — 99214 OFFICE O/P EST MOD 30 MIN: CPT | Mod: 1L

## 2024-04-30 RX ORDER — HYDRALAZINE HYDROCHLORIDE 25 MG/1
25 TABLET ORAL DAILY
Refills: 0 | Status: ACTIVE | COMMUNITY

## 2024-04-30 NOTE — REASON FOR VISIT
[Hyperlipidemia] : hyperlipidemia [FreeTextEntry3] : Kayla [FreeTextEntry1] : I saw this 58-year-old woman in follow-up consultation on  04/30/24 She is 12 years free of ovarian cancer after chemotherapy and radiation she was seen by me 5 years ago and now comes to see me because she was recently in the hospital with Covid and finds that she now has hypertension requiring medication. She is otherwise asymptomatic She saw me 4 years ago because a CT scan showed aortic atherosclerosis.  She had carotid Dopplers which were benign and a cardiac echo which was benign On her current regime of hydralazine 25 3 times daily and Lisinopril 10 mg daily should her blood pressure is well controlled. I will schedule her to get a coronary CTA to evaluate her coronary arteries.  [Follow-Up - From Hospitalization] : follow-up of a recent hospitalization for [Hypertension] : hypertension

## 2024-04-30 NOTE — DISCUSSION/SUMMARY
[FreeTextEntry1] : The repeat  echo was normal. Blood pressure meds rearranged. BP well controlled Return in 6 months There is no issues with her taking Ozempic She has successfully lost a lot of weight and her blood pressure is borderline low.  I will get a cardiac CTA to rule out any cardiac or coronary issues. [EKG obtained to assist in diagnosis and management of assessed problem(s)] : EKG obtained to assist in diagnosis and management of assessed problem(s)

## 2024-05-01 ENCOUNTER — RX RENEWAL (OUTPATIENT)
Age: 59
End: 2024-05-01

## 2024-05-01 RX ORDER — LISINOPRIL 10 MG/1
10 TABLET ORAL DAILY
Qty: 90 | Refills: 2 | Status: ACTIVE | COMMUNITY
Start: 2021-07-02 | End: 1900-01-01

## 2024-05-12 ENCOUNTER — NON-APPOINTMENT (OUTPATIENT)
Age: 59
End: 2024-05-12

## 2024-05-13 DIAGNOSIS — I70.0 ATHEROSCLEROSIS OF AORTA: ICD-10-CM

## 2025-04-02 ENCOUNTER — NON-APPOINTMENT (OUTPATIENT)
Age: 60
End: 2025-04-02

## 2025-04-02 ENCOUNTER — APPOINTMENT (OUTPATIENT)
Dept: OPHTHALMOLOGY | Facility: CLINIC | Age: 60
End: 2025-04-02
Payer: COMMERCIAL

## 2025-04-02 PROCEDURE — 92014 COMPRE OPH EXAM EST PT 1/>: CPT

## 2025-04-23 ENCOUNTER — APPOINTMENT (OUTPATIENT)
Dept: CARDIOLOGY | Facility: CLINIC | Age: 60
End: 2025-04-23